# Patient Record
(demographics unavailable — no encounter records)

---

## 2025-02-14 NOTE — REASON FOR VISIT
[Home] : at home, [unfilled] , at the time of the visit. [Medical Office: ([facility])___] : at the medical office located in  [Telehealth (audio & video)] : This visit was provided via telehealth using real-time 2-way audio visual technology. [Verbal consent obtained from patient] : the patient, [unfilled] [Initial Visit ___] : [unfilled] is here today for an initial visit  for [unfilled]